# Patient Record
Sex: FEMALE | Race: BLACK OR AFRICAN AMERICAN | Employment: FULL TIME | URBAN - NONMETROPOLITAN AREA
[De-identification: names, ages, dates, MRNs, and addresses within clinical notes are randomized per-mention and may not be internally consistent; named-entity substitution may affect disease eponyms.]

---

## 2020-11-03 ENCOUNTER — APPOINTMENT (OUTPATIENT)
Dept: PHYSICAL THERAPY | Age: 40
End: 2020-11-03

## 2020-11-24 ENCOUNTER — HOSPITAL ENCOUNTER (OUTPATIENT)
Dept: PHYSICAL THERAPY | Age: 40
Discharge: HOME | End: 2020-11-24
Payer: OTHER MISCELLANEOUS

## 2020-11-24 PROCEDURE — 97165 OT EVAL LOW COMPLEX 30 MIN: CPT

## 2020-11-24 PROCEDURE — 97110 THERAPEUTIC EXERCISES: CPT

## 2020-11-24 NOTE — PROGRESS NOTES
OT INITIAL EVALUATION NOTE    Patient Name: Maritza Res  Date:2020  : 1980  [x]  Patient  Verified  Payor: Luiz Lloyd / Plan: [id number] / Product Type: Workers Comp /    In [employer] time:1404  Total Treatment Time (min): 50  Visit #: 1     SUBJECTIVE  Pain Level (0-10 scale): 3-4  Any medication changes, allergies to medications, adverse drug reactions, diagnosis change, or new procedure performed?: [] No    [x] Yes (see summary sheet for update)  Subjective:     I fell back on my wrist trying to protect myself when I slipped on the asphalt at work. It occurred on 2020. PLOF: Independent with ADLs. IADLs, + driving, +FT employment    Mechanism of Injury: Fall posteriorly onto outstretched left arm  Previous Treatment/Compliance: went to urgent care after 48 hours of accident; xrays completed and negative results  PMHx/Surgical Hx: None  Work Hx: FT employee at [employer] Situation: N/A  Pt Goals: to be able to not wear the brace as much and return to work  Motivation: highly motivated to return to work as soon as possible  Cognition: A & O x 4        OBJECTIVE    Wrist:  Strength AROM PROM     Left Left Left    Flexion 4+ w/p! 01 91    REXKLCNRT 6 37 04 p! At end range    Pronation 4+ w/p! 90 90    Supination 5 74 90    Ulnar Deviation 5 30 30    Radial Deviation 5 20 20   *All strength measures are on a scale with 5 as a maximum, if a space is left blank it was not tested. Right Left   2-pt pinch 14.6 lbs 9.3   3-pt pinch 18.6 16.3   Lateral pinch 20 18   Gross grasp 68 58.3     Left Thumb MP & IP AROM are WNL with p! And end range of MP flexion  L wrist circumference 15.8 cm; R wrist circumference 16 cm. Pt is right hand dominant. Pt had tenderness along the left radial border beginning at base of the D1 for approximately 3 inches including radial styloid process.  Pain reported at scaphoid and within the palm of her hand. There is no discoloration nor inflammation present. She reports no numbness but tingling within the palm of her hand and radiates distally through her D2-D5. Her sharp/dull is intact excluding distal D1 D2.  Light touch intact. Modality rationale: decrease pain to improve the patients ability to return to work. Min Type Additional Details   10  NC [x]  Ice     []  heat  []  Ice massage  []  Laser   []  Anodyne Position: trough/table top  Location: R hand/wrist POST eval   [x] Skin assessment post-treatment:  [x]intact []redness- no adverse reaction    []redness  adverse reaction:     18 min Therapeutic Exercise:  [x] See flow sheet :   Rationale: increase ROM and increase strength to improve the patients ability to return to work    With   [x] TE   [] TA   [] neuro   [] other: Patient Education: [x] Review HEP    [] Progressed/Changed HEP based on:   [] positioning   [] body mechanics   [] transfers   [] heat/ice application   [] Splint wear/care   [] Sensory re-education   [] scar management      [] other:      Pain Level (0-10 scale) post treatment: 4    ASSESSMENT/Changes in Function: Pt is a 47year old female referred to OT services due to a fall at her place of employment. She stated she fell on 09/01/2020 when she slipped on the asphalt at work. She fell posteriorly and used her LUE to break her fall. Forty-eight hours post fall, she went to the [facility] physician who took XR and they were negative. She is now working light duty and has a wrist brace she wears continually. She was employed at [employer] status before fall at [employer]. She was also independent with ADLs. IADLs, + driving. Throughout her evaluation, she completed all tasks asked of her with intermittent pain. Measurements are charted above. She also demonstrated mild deficits with ROM, strength, and sensation.   Throughout OT services, she will participate in multiple interventions so she is able to return to her PLOF as soon as possible. She will also be given a HEP for daily use. She is an excellent rehab candidate for OT services and would benefit from OT to be able to return to work and her PLOF.   Thank you for your referral.      [x]  See Plan of [address] [facility] Ekaterina, OT 11/24/2020

## 2020-11-24 NOTE — PROGRESS NOTES
[address]  [place], [place]  Ph: [phone]    Fax: [phone]    Plan of Care/Statement of Necessity for Physical Therapy Services  2-15    Patient name: Selvin Bennett  : 1980  Provider#: [id number]  Referral source: Milton Vang PA-C      Medical/Treatment Diagnosis: Radial styloid tenosynovitis (de quervain) [M65.4]     Prior Hospitalization: see medical history     Comorbidities: + tobacco use  Prior Level of Function: Independent with ADLs, IADLs, +driving, FT employee at [employer]  Medications: Verified on Patient Summary List    Start of Care: 2020      Onset Date: 2020       The Plan of Care and following information is based on the information from the initial evaluation. Assessment/ key information: Pt is a 47year old female referred to OT services due to a fall at her place of employment. She stated she fell on 2020 when she slipped on the asphalt at work. She fell posteriorly and used her LUE to break her fall. Forty-eight hours post fall, she [facility] [place] [address] physician who took XR and they were negative. She is now working light duty and has a wrist brace she wears continually. She was employed at [employer] status before fall at [employer]. She was also independent with ADLs. IADLs, + driving.       Throughout her evaluation, she completed all tasks asked of her with intermittent pain. She also demonstrated mild deficits with ROM, strength, and sensation. Specific measurements may be found in the evaluation performed this day. hroughout OT services, she will participate in multiple interventions so she is able to return to her PLOF as soon as possible. She will also be given a HEP for daily use. She is an excellent rehab candidate for OT services and would benefit from OT to be able to return to work and her PLOF.   Thank you for your referral.      Evaluation Complexity History LOW Complexity : Zero comorbidities / personal factors that will impact the outcome / POC; Examination MEDIUM Complexity : 3 Standardized tests and measures addressing body structure, function, activity limitation and / or participation in recreation  ;Presentation LOW Complexity : Stable, uncomplicated  ;Clinical Decision Making LOW Complexity : FOTO score of   Overall Complexity Rating: LOW     Problem List: pain affecting function, decrease ROM, decrease strength, decrease ADL/ functional abilitiies, decrease flexibility/ joint mobility and other decreased sensation   Treatment Plan may include any combination of the following: Therapeutic exercise, Therapeutic activities, Neuromuscular re-education, Physical agent/modality, Manual therapy, Patient education and Self Care training  Patient / Family readiness to learn indicated by: asking questions, trying to perform skills and interest  Persons(s) to be included in education: patient (P)  Barriers to Learning/Limitations: None  Patient Goal (s): to not use the wrist brace as much  Patient Self Reported Health Status: good  Rehabilitation Potential: excellent    Short Term Goals: To be accomplished in 6 treatments:  1) Increase gross grasp by 10 lbs for gross motor activities  2) Increase 2 point & 3-pt pinch by 3 lbs for fine motor activities  3) Increase sensation (sharp/dull) by 75% to increase fine motor tasks  4) Increase wrist ROM x 50% for fine motor coordination tasks  5) Increase wrist muscle strength to 5/5 for gross motor activities    Long Term Goals: To be accomplished in 12 treatments:  1) Pt will be independent in ADL & IADL activities   2) Pt will complete HEP independently    Frequency / Duration: Patient to be seen 1-2 times per week for 12 weeks.     Patient/ Caregiver education and instruction: activity modification and exercises    [x]  Plan of care has been reviewed with FLAKO Flores OT 11/24/2020 ________________________________________________________________________    I certify that the above Therapy Services are being furnished while the patient is under my care. I agree with the treatment plan and certify that this therapy is necessary.     [de-identified] Signature:____________________  Date:____________Time: _________

## 2020-12-01 ENCOUNTER — HOSPITAL ENCOUNTER (OUTPATIENT)
Dept: PHYSICAL THERAPY | Age: 40
Discharge: HOME | End: 2020-12-01
Payer: OTHER MISCELLANEOUS

## 2020-12-01 PROCEDURE — 97530 THERAPEUTIC ACTIVITIES: CPT

## 2020-12-01 PROCEDURE — 97110 THERAPEUTIC EXERCISES: CPT

## 2020-12-01 PROCEDURE — 97035 APP MDLTY 1+ULTRASOUND EA 15: CPT

## 2020-12-01 NOTE — PROGRESS NOTES
[facility] OT DAILY TREATMENT NOTE  3-16    Patient Name: Maikel Ayala  Date:2020  : 1980  [x]  Patient  Verified  Payor: Allyson Gandhi / Plan: FÉLIX Gandhi / Product Type: Workers Comp /    In [employer] time:1150  Total Treatment Time (min): 52  Visit #: 2 of 6    Treatment Area: Radial styloid tenosynovitis (de quervain) [M65.4]    SUBJECTIVE  Pain Level (0-10 scale): 6  Any medication changes, allergies to medications, adverse drug reactions, diagnosis change, or new procedure performed?: [x] No    [] Yes (see summary sheet for update)  Subjective functional status/changes:   [] No changes reported  It's okay. When it's cold like this it aches. I know it's there.       OBJECTIVE    Modality rationale: decrease pain and increase tissue extensibility to improve the patients ability to complete work tasks   Min Type Additional Details   8 [x]  Ultrasound: [x]Continuous   [] Pulsed                           []1MHz   [x]3MHz W/cm2: 1.0  Location: left Radial styloid process and surrounding structures   [x] Skin assessment post-treatment:  [x]intact []redness- no adverse reaction    []redness  adverse reaction:     31 min Therapeutic Exercise:  [x] See flow sheet :   Rationale: increase ROM and increase strength to improve the patients ability to complete work tasks    8 min Therapeutic Activity:  [x]  See flow sheet :   Rationale: increase ROM and increase strength  to improve the patients ability to return to work     With   [x] TE   [] TA   [] neuro   [] other: Patient Education: [x] Review HEP    [x] Progressed/Changed HEP based on:   [] positioning   [] body mechanics   [] transfers   [] heat/ice application   [] Splint wear/care   [] Sensory re-education   [] scar management      [x] other: adding onto program     Pain Level (0-10 scale) post treatment: 4    ASSESSMENT/Changes in Function: Pt receiving 2 of 6 tx's this day and was introduced to additional exercises to strengthen and improve ROM of her wrist.  Exercises were also added to HEP for thumb strengthening. She tolerates exercises well and completes all tasks asked of her. Pt will be ready for increased weights at next visit. Patient will continue to benefit from skilled OT services to modify and progress therapeutic interventions, address ROM deficits, address strength deficits and analyze and address soft tissue restrictions to attain remaining goals.      [x]  See Plan of Care  []  See progress note/recertification  []  See Discharge Summary         Progress towards goals / Updated goals:  1) Increase gross grasp by 10 lbs for gross motor activities  2) Increase 2 point & 3-pt pinch by 3 lbs for fine motor activities  3) Increase sensation (sharp/dull) by 75% to increase fine motor tasks  4) Increase wrist ROM x 50% for fine motor coordination tasks  5) Increase wrist muscle strength to 5/5 for gross motor activities    PLAN  [x]  Upgrade activities as tolerated     [x]  Continue plan of care  []  Update interventions per flow sheet       []  Discharge due to:_  []  Other:_      Paige Cunningham OT 12/1/2020

## 2020-12-03 ENCOUNTER — HOSPITAL ENCOUNTER (OUTPATIENT)
Dept: PHYSICAL THERAPY | Age: 40
Discharge: HOME | End: 2020-12-03
Payer: OTHER MISCELLANEOUS

## 2020-12-03 PROCEDURE — 97110 THERAPEUTIC EXERCISES: CPT

## 2020-12-03 PROCEDURE — 97035 APP MDLTY 1+ULTRASOUND EA 15: CPT

## 2020-12-03 NOTE — PROGRESS NOTES
OT DAILY TREATMENT NOTE  3-16    Patient Name: Dickson Bess  Date:12/3/2020  : 1980  [x]  Patient  Verified  Payor: Frederic Fernandez / Plan: FÉLIX Fernandez / Product Type:  Workers Comp /    In [address] time:1440  Total Treatment Time (min): 29  Visit #: 3 of 6    Treatment Area: Radial styloid tenosynovitis (de quervain) [M65.4]    SUBJECTIVE  Pain Level (0-10 scale): 3  Any medication changes, allergies to medications, adverse drug reactions, diagnosis change, or new procedure performed?: [x] No    [] Yes (see summary sheet for update)  Subjective functional status/changes:   [x] No changes reported    OBJECTIVE    Modality rationale: decrease pain to improve the patients ability to be pain free   Min Type Additional Details    [] Estim:  []Unatt       []IFC  []Premod                        []Other:  []w/ice   []w/heat  Position:  Location:    [] Estim: []Att    []TENS instruct  []NMES                    []Other:  []w/US   []w/ice   []w/heat  Position:  Location:    []  Traction: [] Cervical       []Lumbar                       [] Prone          []Supine                       []Intermittent   []Continuous Lbs:  [] before manual  [] after manual   8 []  Ultrasound: []Continuous   [x] Pulsed                           [x]1MHz   []3MHz W/cm2: 1.0  Location: Radial border L D1 & wrist    []  Iontophoresis with dexamethasone         Location: [] Take home patch   [] In clinic    []  Ice     []  heat  []  Ice massage  []  Laser   []  Anodyne Position:  Location:    []  Laser with stim  []  Other:  Position:  Location:    []  Vasopneumatic Device Pressure:       [] lo [] med [] hi   Temperature: [] lo [] med [] hi   [x] Skin assessment post-treatment:  [x]intact [x]redness- no adverse reaction    []redness  adverse reaction:     21 min Therapeutic Exercise:  [x] See flow sheet :   Rationale: increase strength to improve the patients ability to safely use L hand for ADL and IADL tasks        With   [] TE   [] TA   [] neuro   [] other: Patient Education: [x] Review HEP    [] Progressed/Changed HEP based on:   [] positioning   [] body mechanics   [] transfers   [] heat/ice application   [] Splint wear/care   [] Sensory re-education   [] scar management      [] other:              Pain Level (0-10 scale) post treatment: 3    ASSESSMENT/Changes in Function: Pt  Reports minimal change in pain. Patient will continue to benefit from skilled OT services to modify and progress therapeutic interventions and address strength deficits to attain remaining goals.      [x]  See Plan of Care  []  See progress note/recertification  []  See Discharge Summary         Progress towards goals / Updated goals:  1) Increase gross grasp by 10 lbs for gross motor activities  2) Increase 2 point & 3-pt pinch by 3 lbs for fine motor activities  3) Increase sensation (sharp/dull) by 75% to increase fine motor tasks  4) Increase wrist ROM x 50% for fine motor coordination tasks  5) Increase wrist muscle strength to 5/5 for gross motor activities    PLAN  [x]  Upgrade activities as tolerated     [x]  Continue plan of care  []  Update interventions per flow sheet       []  Discharge due to:_  []  Other:_      Colton Kocher, COTA-L 12/3/2020

## 2021-01-13 NOTE — PROGRESS NOTES
[address]  [address] [place], [place] [address],  [address] [phone] [address] [employer] [facility]          Patient Name: Ailyn Cullen : 1980   Treatment/Medical Diagnosis: Radial styloid tenosynovitis (de quervain) [M65.4]   Onset Date: 20    Referral Source: [facility]): 20   Prior Hospitalization: na Provider #: [id number]   Prior Level of Function: Works full time at [employer]; slipped and fell on outstretched hand 20   Comorbidities: None    Medications: Verified on Patient Summary List   [facility] Missed Visits: 3      Goal/Measure of Progress- formal reassessment not completed as pt did not return for appt  Goal Met? 1.     Status at last Eval:  Current Status:  n/a   2.     Status at last Eval:  Current Status:  n/a   3.     Status at last Eval:  Current Status:  n/a   4.     Status at last Eval:  Current Status:  n/a     Key Functional Changes/Progress: pt made some progress towards goals, but did not continue therapy services. Plan to d/c at present to to noncompliance with attendance. Assessments/Recommendations: Discontinue therapy due to lack of attendance or compliance. If you have any questions/comments please contact us directly at [phone]   Thank you for allowing us to assist in the care of your patient. Therapist Signature:  Fina Polk Date: 2021   Reporting Period: 20 Time: 2:07 PM      Certification Period: 20-12/3/20       NOTE TO PHYSICIAN:  PLEASE COMPLETE THE ORDERS BELOW AND FAX -[phone]    If you are unable to process this request in 24 hours please contact our office: [phone].    ___ I have read the above report and request that my patient be discharged from therapy.      Physician Signature:        Date:       Time:

## 2024-06-27 ENCOUNTER — HOSPITAL ENCOUNTER (EMERGENCY)
Facility: HOSPITAL | Age: 44
Discharge: HOME | End: 2024-06-27
Attending: EMERGENCY MEDICINE
Payer: MEDICAID

## 2024-06-27 ENCOUNTER — APPOINTMENT (OUTPATIENT)
Facility: HOSPITAL | Age: 44
End: 2024-06-27
Payer: MEDICAID

## 2024-06-27 VITALS
HEIGHT: 66 IN | RESPIRATION RATE: 14 BRPM | SYSTOLIC BLOOD PRESSURE: 136 MMHG | BODY MASS INDEX: 25.37 KG/M2 | TEMPERATURE: 98.4 F | WEIGHT: 157.85 LBS | OXYGEN SATURATION: 98 % | DIASTOLIC BLOOD PRESSURE: 99 MMHG | HEART RATE: 87 BPM

## 2024-06-27 DIAGNOSIS — G44.209 TENSION HEADACHE: ICD-10-CM

## 2024-06-27 DIAGNOSIS — R07.9 CHEST PAIN, UNSPECIFIED TYPE: Primary | ICD-10-CM

## 2024-06-27 LAB
ALBUMIN SERPL-MCNC: 3.8 G/DL (ref 3.5–5)
ALBUMIN/GLOB SERPL: 1.1 (ref 1.1–2.2)
ALP SERPL-CCNC: 72 U/L (ref 45–117)
ALT SERPL-CCNC: 18 U/L (ref 12–78)
ANION GAP SERPL CALC-SCNC: 7 MMOL/L (ref 5–15)
AST SERPL-CCNC: 14 U/L (ref 15–37)
BASOPHILS # BLD: 0.1 K/UL (ref 0–0.1)
BASOPHILS NFR BLD: 1 % (ref 0–1)
BILIRUB SERPL-MCNC: 0.5 MG/DL (ref 0.2–1)
BUN SERPL-MCNC: 7 MG/DL (ref 6–20)
BUN/CREAT SERPL: 8 (ref 12–20)
CALCIUM SERPL-MCNC: 8.9 MG/DL (ref 8.5–10.1)
CHLORIDE SERPL-SCNC: 106 MMOL/L (ref 97–108)
CO2 SERPL-SCNC: 23 MMOL/L (ref 21–32)
CREAT SERPL-MCNC: 0.86 MG/DL (ref 0.55–1.02)
DIFFERENTIAL METHOD BLD: ABNORMAL
EKG ATRIAL RATE: 86 BPM
EKG DIAGNOSIS: NORMAL
EKG P AXIS: 65 DEGREES
EKG P-R INTERVAL: 148 MS
EKG Q-T INTERVAL: 366 MS
EKG QRS DURATION: 72 MS
EKG QTC CALCULATION (BAZETT): 437 MS
EKG R AXIS: 3 DEGREES
EKG T AXIS: 34 DEGREES
EKG VENTRICULAR RATE: 86 BPM
EOSINOPHIL # BLD: 0.2 K/UL (ref 0–0.4)
EOSINOPHIL NFR BLD: 3 % (ref 0–7)
ERYTHROCYTE [DISTWIDTH] IN BLOOD BY AUTOMATED COUNT: 15.2 % (ref 11.5–14.5)
GLOBULIN SER CALC-MCNC: 3.4 G/DL (ref 2–4)
GLUCOSE SERPL-MCNC: 79 MG/DL (ref 65–100)
HCT VFR BLD AUTO: 38.7 % (ref 35–47)
HGB BLD-MCNC: 13.4 G/DL (ref 11.5–16)
IMM GRANULOCYTES # BLD AUTO: 0 K/UL (ref 0–0.04)
IMM GRANULOCYTES NFR BLD AUTO: 0 % (ref 0–0.5)
LYMPHOCYTES # BLD: 2.1 K/UL (ref 0.8–3.5)
LYMPHOCYTES NFR BLD: 31 % (ref 12–49)
MAGNESIUM SERPL-MCNC: 1.8 MG/DL (ref 1.6–2.4)
MCH RBC QN AUTO: 31.2 PG (ref 26–34)
MCHC RBC AUTO-ENTMCNC: 34.6 G/DL (ref 30–36.5)
MCV RBC AUTO: 90.2 FL (ref 80–99)
MONOCYTES # BLD: 0.5 K/UL (ref 0–1)
MONOCYTES NFR BLD: 7 % (ref 5–13)
NEUTS SEG # BLD: 4.1 K/UL (ref 1.8–8)
NEUTS SEG NFR BLD: 58 % (ref 32–75)
NRBC # BLD: 0 K/UL (ref 0–0.01)
NRBC BLD-RTO: 0 PER 100 WBC
PLATELET # BLD AUTO: 308 K/UL (ref 150–400)
PMV BLD AUTO: 10.5 FL (ref 8.9–12.9)
POTASSIUM SERPL-SCNC: 3 MMOL/L (ref 3.5–5.1)
PROT SERPL-MCNC: 7.2 G/DL (ref 6.4–8.2)
RBC # BLD AUTO: 4.29 M/UL (ref 3.8–5.2)
SODIUM SERPL-SCNC: 136 MMOL/L (ref 136–145)
TROPONIN I SERPL HS-MCNC: <4 NG/L (ref 0–51)
WBC # BLD AUTO: 7 K/UL (ref 3.6–11)

## 2024-06-27 PROCEDURE — 36415 COLL VENOUS BLD VENIPUNCTURE: CPT

## 2024-06-27 PROCEDURE — 99285 EMERGENCY DEPT VISIT HI MDM: CPT

## 2024-06-27 PROCEDURE — 93005 ELECTROCARDIOGRAM TRACING: CPT | Performed by: EMERGENCY MEDICINE

## 2024-06-27 PROCEDURE — 6370000000 HC RX 637 (ALT 250 FOR IP): Performed by: EMERGENCY MEDICINE

## 2024-06-27 PROCEDURE — 85025 COMPLETE CBC W/AUTO DIFF WBC: CPT

## 2024-06-27 PROCEDURE — 80053 COMPREHEN METABOLIC PANEL: CPT

## 2024-06-27 PROCEDURE — 84484 ASSAY OF TROPONIN QUANT: CPT

## 2024-06-27 PROCEDURE — 83735 ASSAY OF MAGNESIUM: CPT

## 2024-06-27 PROCEDURE — 71045 X-RAY EXAM CHEST 1 VIEW: CPT

## 2024-06-27 RX ORDER — BUTALBITAL, ACETAMINOPHEN AND CAFFEINE 50; 325; 40 MG/1; MG/1; MG/1
1 TABLET ORAL EVERY 6 HOURS PRN
Qty: 20 TABLET | Refills: 0 | Status: SHIPPED | OUTPATIENT
Start: 2024-06-27

## 2024-06-27 RX ORDER — FAMOTIDINE 20 MG/1
20 TABLET, FILM COATED ORAL 2 TIMES DAILY
Qty: 60 TABLET | Refills: 0 | Status: SHIPPED | OUTPATIENT
Start: 2024-06-27

## 2024-06-27 RX ADMIN — ALUMINUM HYDROXIDE, MAGNESIUM HYDROXIDE, AND SIMETHICONE 40 ML: 1200; 120; 1200 SUSPENSION ORAL at 15:38

## 2024-06-27 ASSESSMENT — PAIN DESCRIPTION - LOCATION: LOCATION: CHEST

## 2024-06-27 ASSESSMENT — PAIN SCALES - GENERAL
PAINLEVEL_OUTOF10: 8
PAINLEVEL_OUTOF10: 8

## 2024-06-27 ASSESSMENT — PAIN DESCRIPTION - DESCRIPTORS: DESCRIPTORS: TIGHTNESS

## 2024-06-27 ASSESSMENT — LIFESTYLE VARIABLES
HOW OFTEN DO YOU HAVE A DRINK CONTAINING ALCOHOL: NEVER
HOW MANY STANDARD DRINKS CONTAINING ALCOHOL DO YOU HAVE ON A TYPICAL DAY: PATIENT DOES NOT DRINK

## 2024-06-27 ASSESSMENT — PAIN DESCRIPTION - ORIENTATION: ORIENTATION: MID

## 2024-06-28 NOTE — ED PROVIDER NOTES
infiltrate.  Patient was given a GI cocktail for her symptoms.      EKG obtained at 1:44 PM interpreted by me normal sinus rhythm, rate 86, normal axis/WI/QRS, no acute ST changes.      Patient is chest pain resolved with GI cocktail.  This to be consistent with patient stating she has been under significant amount of more stress.  Labs unremarkable.  Troponin negative.  Patient's pain has been ongoing greater than 12 hours.  Do not feel a second troponin is needed.  Patient to be discharged home.    Heart Score: 1        FINAL IMPRESSION     1. Chest pain, unspecified type    2. Tension headache          DISPOSITION/PLAN   Whitney Butcher's  results have been reviewed with her.  She has been counseled regarding her diagnosis, treatment, and plan.  She verbally conveys understanding and agreement of the signs, symptoms, diagnosis, treatment and prognosis and additionally agrees to follow up as discussed.  She also agrees with the care-plan and conveys that all of her questions have been answered.  I have also provided discharge instructions for her that include: educational information regarding their diagnosis and treatment, and list of reasons why they would want to return to the ED prior to their follow-up appointment, should her condition change.     CLINICAL IMPRESSION    Discharge Note: The patient is stable for discharge home. The signs, symptoms, diagnosis, and discharge instructions have been discussed, understanding conveyed, and agreed upon. The patient is to follow up as recommended or return to ER should their symptoms worsen.      PATIENT REFERRED TO:  No follow-up provider specified.     DISCHARGE MEDICATIONS:     Medication List        START taking these medications      butalbital-acetaminophen-caffeine -40 MG per tablet  Commonly known as: FIORICET, ESGIC  Take 1 tablet by mouth every 6 hours as needed for Headaches     famotidine 20 MG tablet  Commonly known as: Pepcid  Take 1 tablet by mouth

## 2024-07-11 ENCOUNTER — HOSPITAL ENCOUNTER (EMERGENCY)
Facility: HOSPITAL | Age: 44
Discharge: HOME | End: 2024-07-11
Attending: STUDENT IN AN ORGANIZED HEALTH CARE EDUCATION/TRAINING PROGRAM
Payer: MEDICAID

## 2024-07-11 VITALS
SYSTOLIC BLOOD PRESSURE: 128 MMHG | HEIGHT: 65 IN | TEMPERATURE: 98.2 F | BODY MASS INDEX: 26.33 KG/M2 | DIASTOLIC BLOOD PRESSURE: 96 MMHG | RESPIRATION RATE: 17 BRPM | OXYGEN SATURATION: 100 % | HEART RATE: 73 BPM | WEIGHT: 158 LBS

## 2024-07-11 DIAGNOSIS — B97.89 VIRAL SINUSITIS: Primary | ICD-10-CM

## 2024-07-11 DIAGNOSIS — R05.1 ACUTE COUGH: ICD-10-CM

## 2024-07-11 DIAGNOSIS — J32.9 VIRAL SINUSITIS: Primary | ICD-10-CM

## 2024-07-11 LAB
EKG ATRIAL RATE: 73 BPM
EKG DIAGNOSIS: NORMAL
EKG P AXIS: 62 DEGREES
EKG P-R INTERVAL: 156 MS
EKG Q-T INTERVAL: 374 MS
EKG QRS DURATION: 74 MS
EKG QTC CALCULATION (BAZETT): 412 MS
EKG R AXIS: 15 DEGREES
EKG T AXIS: 38 DEGREES
EKG VENTRICULAR RATE: 73 BPM

## 2024-07-11 PROCEDURE — 6360000002 HC RX W HCPCS: Performed by: STUDENT IN AN ORGANIZED HEALTH CARE EDUCATION/TRAINING PROGRAM

## 2024-07-11 PROCEDURE — 36415 COLL VENOUS BLD VENIPUNCTURE: CPT

## 2024-07-11 PROCEDURE — 6370000000 HC RX 637 (ALT 250 FOR IP): Performed by: STUDENT IN AN ORGANIZED HEALTH CARE EDUCATION/TRAINING PROGRAM

## 2024-07-11 PROCEDURE — 94760 N-INVAS EAR/PLS OXIMETRY 1: CPT

## 2024-07-11 PROCEDURE — 99284 EMERGENCY DEPT VISIT MOD MDM: CPT

## 2024-07-11 PROCEDURE — 96374 THER/PROPH/DIAG INJ IV PUSH: CPT

## 2024-07-11 PROCEDURE — 93005 ELECTROCARDIOGRAM TRACING: CPT | Performed by: STUDENT IN AN ORGANIZED HEALTH CARE EDUCATION/TRAINING PROGRAM

## 2024-07-11 RX ORDER — KETOROLAC TROMETHAMINE 15 MG/ML
15 INJECTION, SOLUTION INTRAMUSCULAR; INTRAVENOUS
Status: COMPLETED | OUTPATIENT
Start: 2024-07-11 | End: 2024-07-11

## 2024-07-11 RX ORDER — BENZONATATE 100 MG/1
100 CAPSULE ORAL 3 TIMES DAILY PRN
Status: DISCONTINUED | OUTPATIENT
Start: 2024-07-11 | End: 2024-07-11 | Stop reason: HOSPADM

## 2024-07-11 RX ORDER — PSEUDOEPHEDRINE HCL 30 MG
30 TABLET ORAL EVERY 4 HOURS PRN
Qty: 42 TABLET | Refills: 0 | Status: SHIPPED | OUTPATIENT
Start: 2024-07-11 | End: 2024-07-18

## 2024-07-11 RX ORDER — IBUPROFEN 600 MG/1
600 TABLET ORAL EVERY 6 HOURS PRN
Qty: 30 TABLET | Refills: 0 | Status: SHIPPED | OUTPATIENT
Start: 2024-07-11

## 2024-07-11 RX ORDER — BENZONATATE 200 MG/1
200 CAPSULE ORAL 3 TIMES DAILY PRN
Qty: 21 CAPSULE | Refills: 0 | Status: SHIPPED | OUTPATIENT
Start: 2024-07-11 | End: 2024-07-18

## 2024-07-11 RX ADMIN — BENZONATATE 100 MG: 100 CAPSULE ORAL at 06:59

## 2024-07-11 RX ADMIN — KETOROLAC TROMETHAMINE 15 MG: 15 INJECTION, SOLUTION INTRAMUSCULAR; INTRAVENOUS at 06:56

## 2024-07-11 ASSESSMENT — PAIN - FUNCTIONAL ASSESSMENT
PAIN_FUNCTIONAL_ASSESSMENT: 0-10
PAIN_FUNCTIONAL_ASSESSMENT: NONE - DENIES PAIN

## 2024-07-11 ASSESSMENT — PAIN DESCRIPTION - PAIN TYPE: TYPE: ACUTE PAIN

## 2024-07-11 ASSESSMENT — PAIN DESCRIPTION - DESCRIPTORS: DESCRIPTORS: POUNDING

## 2024-07-11 ASSESSMENT — PAIN DESCRIPTION - ORIENTATION: ORIENTATION: MID

## 2024-07-11 ASSESSMENT — PAIN SCALES - GENERAL
PAINLEVEL_OUTOF10: 0
PAINLEVEL_OUTOF10: 8

## 2024-07-11 ASSESSMENT — PAIN DESCRIPTION - LOCATION: LOCATION: HEAD

## 2024-07-11 NOTE — ED PROVIDER NOTES
does not have any medical indication for emergent x-ray imaging or blood work.  No current dizziness or dizziness earlier today.  Only symptoms are from sinus infection.  OTC and follow-up with PCP indicated    Symptomatic therapy suggested: acetaminophen, ibuprofen, Sudafed. Increase fluids, use vaporizer, stay in steamy bathroom tid 15 min prn severe cough, tylenol as needed, rest, avoid smoky areas. Lack of antibiotic effectiveness discussed with her. Symptomatic therapy suggested: gargle for sore throat, use mist at bedside for congestion.  Apply facial warm packs for sinus pain or use nasal saline sprays. Follow up prn if not better in 72 hours.      Records Reviewed (source and summary of external notes): Prior medical records and Nursing notes.    Vitals:    Vitals:    07/11/24 0631   BP: (!) 149/95   Pulse: 75   Resp: 18   Temp: 98.2 °F (36.8 °C)   TempSrc: Oral   SpO2: 100%   Weight: 71.7 kg (158 lb)   Height: 1.651 m (5' 5\")        ED COURSE       SEPSIS Reassessment: Sepsis reassessment not applicable    Clinical Management Tools:  Not Applicable    Patient was given the following medications:  Medications   benzonatate (TESSALON) capsule 100 mg (100 mg Oral Given 7/11/24 0659)   ketorolac (TORADOL) injection 15 mg (15 mg IntraVENous Given 7/11/24 0656)       CONSULTS: See ED Course/MDM for further details.  None     Social Determinants affecting Diagnosis/Treatment: None    Smoking Cessation: Not Applicable    PROCEDURES   Unless otherwise noted above, none  Procedures      CRITICAL CARE TIME   Patient does not meet Critical Care Time, 0 minutes    ED IMPRESSION     1. Viral sinusitis    2. Acute cough          DISPOSITION/PLAN   DISPOSITION Decision To Discharge 07/11/2024 07:22:51 AM    Discharge Note: The patient is stable for discharge home. The signs, symptoms, diagnosis, and discharge instructions have been discussed, understanding conveyed, and agreed upon. The patient is to follow up as